# Patient Record
Sex: MALE | Race: ASIAN
[De-identification: names, ages, dates, MRNs, and addresses within clinical notes are randomized per-mention and may not be internally consistent; named-entity substitution may affect disease eponyms.]

---

## 2021-03-16 ENCOUNTER — HOSPITAL ENCOUNTER (OUTPATIENT)
Dept: HOSPITAL 61 - LAB | Age: 76
End: 2021-03-16
Attending: OPHTHALMOLOGY
Payer: COMMERCIAL

## 2021-03-16 DIAGNOSIS — Z01.812: Primary | ICD-10-CM

## 2021-03-16 DIAGNOSIS — Z20.822: ICD-10-CM

## 2021-03-16 PROCEDURE — U0003 INFECTIOUS AGENT DETECTION BY NUCLEIC ACID (DNA OR RNA); SEVERE ACUTE RESPIRATORY SYNDROME CORONAVIRUS 2 (SARS-COV-2) (CORONAVIRUS DISEASE [COVID-19]), AMPLIFIED PROBE TECHNIQUE, MAKING USE OF HIGH THROUGHPUT TECHNOLOGIES AS DESCRIBED BY CMS-2020-01-R: HCPCS

## 2021-03-19 ENCOUNTER — HOSPITAL ENCOUNTER (OUTPATIENT)
Dept: HOSPITAL 61 - SURG | Age: 76
Discharge: HOME | End: 2021-03-19
Attending: OPHTHALMOLOGY
Payer: COMMERCIAL

## 2021-03-19 VITALS — SYSTOLIC BLOOD PRESSURE: 157 MMHG | DIASTOLIC BLOOD PRESSURE: 94 MMHG

## 2021-03-19 DIAGNOSIS — Z90.49: ICD-10-CM

## 2021-03-19 DIAGNOSIS — H25.89: Primary | ICD-10-CM

## 2021-03-19 DIAGNOSIS — Z98.890: ICD-10-CM

## 2021-03-19 DIAGNOSIS — Z79.899: ICD-10-CM

## 2021-03-19 PROCEDURE — V2632 POST CHMBR INTRAOCULAR LENS: HCPCS

## 2021-03-19 PROCEDURE — 66984 XCAPSL CTRC RMVL W/O ECP: CPT

## 2021-03-19 RX ADMIN — PHENYLEPHRINE HYDROCHLORIDE SCH DROP: 100 SOLUTION/ DROPS OPHTHALMIC at 10:15

## 2021-03-19 RX ADMIN — PHENYLEPHRINE HYDROCHLORIDE SCH DROP: 100 SOLUTION/ DROPS OPHTHALMIC at 10:20

## 2021-03-19 RX ADMIN — CYCLOPENTOLATE HYDROCHLORIDE SCH DROP: 20 SOLUTION/ DROPS OPHTHALMIC at 10:31

## 2021-03-19 RX ADMIN — CYCLOPENTOLATE HYDROCHLORIDE SCH DROP: 20 SOLUTION/ DROPS OPHTHALMIC at 10:36

## 2021-03-19 RX ADMIN — CYCLOPENTOLATE HYDROCHLORIDE SCH DROP: 20 SOLUTION/ DROPS OPHTHALMIC at 10:26

## 2021-03-19 RX ADMIN — PHENYLEPHRINE HYDROCHLORIDE SCH DROP: 100 SOLUTION/ DROPS OPHTHALMIC at 10:25

## 2021-03-19 NOTE — OP
DATE OF SURGERY:  03/19/2021



PREOPERATIVE DIAGNOSIS:  Cataract of the left eye.



PROCEDURE:  Phacoemulsification with posterior chamber intraocular lens

implantation of the left eye.



INDICATION:  Painless progressive visual loss and visually significant cataract.



SURGEON:  Jane Laguna MD



ANESTHESIA:  Topical with monitored anesthesia care.



DESCRIPTION OF PROCEDURE:  The left eye was prepped with Betadine in the usual

sterile fashion and draped.  A paracentesis was performed followed by

instillation of preservative-free phenylephrine admixed with lidocaine and

balanced salt solution.  A temporal clear corneal incision was made followed by

instillation of Viscoat.  A capsulorrhexis was then performed followed by

hydroexpression of the nucleus and Viscoat was placed both anterior and

posterior to the nucleus.  The phacoemulsification handpiece was used to

evacuate the nucleus and the I/A handpiece used to remove the cortex. 

Viscoelastic was injected in the capsular bag and an [employer] model [id number] with a

power of 21.5 diopters was placed into the capsular bag.  Balanced salt solution

was used to hydrate the corneal wounds and once no leak was noted, Maxitrol was

placed on the eye and the eye shielded and the patient was sent to the recovery

room uneventfully.

 



______________________________

JANE LAGUNA MD



DR:  SD/diana  JOB#:  [id number] / [id number]

DD:  03/19/2021 12:32  DT:  03/19/2021 12:39

## 2021-10-15 ENCOUNTER — HOSPITAL ENCOUNTER (EMERGENCY)
Dept: HOSPITAL 35 - ER | Age: 76
Discharge: HOME | End: 2021-10-15
Payer: COMMERCIAL

## 2021-10-15 VITALS — SYSTOLIC BLOOD PRESSURE: 176 MMHG | DIASTOLIC BLOOD PRESSURE: 95 MMHG

## 2021-10-15 VITALS — BODY MASS INDEX: 25.52 KG/M2 | WEIGHT: 130.01 LBS | HEIGHT: 60 IN

## 2021-10-15 DIAGNOSIS — N40.0: ICD-10-CM

## 2021-10-15 DIAGNOSIS — R33.9: Primary | ICD-10-CM

## 2021-10-15 LAB
ALBUMIN SERPL-MCNC: 3.9 G/DL (ref 3.4–5)
ALT SERPL-CCNC: 26 U/L (ref 16–63)
ANION GAP SERPL CALC-SCNC: 9 MMOL/L (ref 7–16)
AST SERPL-CCNC: 36 U/L (ref 15–37)
BASOPHILS NFR BLD AUTO: 0.6 % (ref 0–2)
BILIRUB SERPL-MCNC: 1 MG/DL (ref 0.2–1)
BILIRUB UR-MCNC: NEGATIVE MG/DL
BUN SERPL-MCNC: 15 MG/DL (ref 7–18)
CALCIUM SERPL-MCNC: 8.8 MG/DL (ref 8.5–10.1)
CHLORIDE SERPL-SCNC: 105 MMOL/L (ref 98–107)
CO2 SERPL-SCNC: 25 MMOL/L (ref 21–32)
COLOR UR: YELLOW
CREAT SERPL-MCNC: 1 MG/DL (ref 0.7–1.3)
EOSINOPHIL NFR BLD: 1.4 % (ref 0–3)
ERYTHROCYTE [DISTWIDTH] IN BLOOD BY AUTOMATED COUNT: 13.3 % (ref 10.5–14.5)
GLUCOSE SERPL-MCNC: 206 MG/DL (ref 74–106)
GRANULOCYTES NFR BLD MANUAL: 85.2 % (ref 36–66)
HCT VFR BLD CALC: 44.7 % (ref 42–52)
HGB BLD-MCNC: 15 GM/DL (ref 14–18)
KETONES UR STRIP-MCNC: NEGATIVE MG/DL
LYMPHOCYTES NFR BLD AUTO: 8.1 % (ref 24–44)
MCH RBC QN AUTO: 30.6 PG (ref 26–34)
MCHC RBC AUTO-ENTMCNC: 33.6 G/DL (ref 28–37)
MCV RBC: 91 FL (ref 80–100)
MONOCYTES NFR BLD: 4.7 % (ref 1–8)
NEUTROPHILS # BLD: 7.8 THOU/UL (ref 1.4–8.2)
PLATELET # BLD: 215 THOU/UL (ref 150–400)
POTASSIUM SERPL-SCNC: 4.2 MMOL/L (ref 3.5–5.1)
PROT SERPL-MCNC: 7.6 G/DL (ref 6.4–8.2)
RBC # BLD AUTO: 4.91 MIL/UL (ref 4.5–6)
RBC # UR STRIP: (no result) /UL
RBC #/AREA URNS HPF: (no result) /HPF
SODIUM SERPL-SCNC: 139 MMOL/L (ref 136–145)
SP GR UR STRIP: 1.02 (ref 1–1.03)
URINE CLARITY: CLEAR
URINE GLUCOSE-RANDOM*: NEGATIVE
URINE LEUKOCYTES-REFLEX: NEGATIVE
URINE NITRITE-REFLEX: NEGATIVE
URINE PROTEIN (DIPSTICK): NEGATIVE
UROBILINOGEN UR STRIP-ACNC: 0.2 E.U./DL (ref 0.2–1)
WBC # BLD AUTO: 9.2 THOU/UL (ref 4–11)